# Patient Record
Sex: FEMALE | Race: WHITE | NOT HISPANIC OR LATINO | Employment: FULL TIME | URBAN - METROPOLITAN AREA
[De-identification: names, ages, dates, MRNs, and addresses within clinical notes are randomized per-mention and may not be internally consistent; named-entity substitution may affect disease eponyms.]

---

## 2018-04-10 ENCOUNTER — HISTORICAL (OUTPATIENT)
Dept: ADMINISTRATIVE | Facility: HOSPITAL | Age: 53
End: 2018-04-10

## 2018-10-12 ENCOUNTER — HISTORICAL (OUTPATIENT)
Dept: ADMINISTRATIVE | Facility: HOSPITAL | Age: 53
End: 2018-10-12

## 2018-10-12 LAB
ABS NEUT (OLG): 3.4
ALBUMIN SERPL-MCNC: 5.3 GM/DL (ref 3.4–5)
ALBUMIN/GLOB SERPL: 2.04 {RATIO} (ref 1.5–2.5)
ALP SERPL-CCNC: 75 UNIT/L (ref 38–126)
ALT SERPL-CCNC: 13 UNIT/L (ref 7–52)
AST SERPL-CCNC: 15 UNIT/L (ref 15–37)
BILIRUB SERPL-MCNC: 0.7 MG/DL (ref 0.2–1)
BILIRUBIN DIRECT+TOT PNL SERPL-MCNC: 0.2 MG/DL (ref 0–0.5)
BILIRUBIN DIRECT+TOT PNL SERPL-MCNC: 0.5 MG/DL
BUN SERPL-MCNC: 34 MG/DL (ref 7–18)
CALCIUM SERPL-MCNC: 10.5 MG/DL (ref 8.5–10)
CHLORIDE SERPL-SCNC: 100 MMOL/L (ref 98–107)
CHOLEST SERPL-MCNC: 282 MG/DL (ref 0–200)
CHOLEST/HDLC SERPL: 6.3 {RATIO}
CO2 SERPL-SCNC: 28 MMOL/L (ref 21–32)
CREAT SERPL-MCNC: 1.07 MG/DL (ref 0.6–1.3)
ERYTHROCYTE [DISTWIDTH] IN BLOOD BY AUTOMATED COUNT: 12.5 % (ref 11.5–17)
GLOBULIN SER-MCNC: 2.6 GM/DL (ref 1.2–3)
GLUCOSE SERPL-MCNC: 91 MG/DL (ref 74–106)
HCT VFR BLD AUTO: 42.8 % (ref 37–47)
HDLC SERPL-MCNC: 45 MG/DL (ref 35–60)
HGB BLD-MCNC: 14.2 GM/DL (ref 12–16)
LDLC SERPL CALC-MCNC: 220 MG/DL (ref 0–129)
LYMPHOCYTES # BLD AUTO: 1.4 X10(3)/MCL (ref 0.6–3.4)
LYMPHOCYTES NFR BLD AUTO: 26.7 % (ref 13–40)
MCH RBC QN AUTO: 28.5 PG (ref 27–31.2)
MCHC RBC AUTO-ENTMCNC: 33 GM/DL (ref 32–36)
MCV RBC AUTO: 86 FL (ref 80–94)
MONOCYTES # BLD AUTO: 0.5 X10(3)/MCL (ref 0–1.8)
MONOCYTES NFR BLD AUTO: 9.3 % (ref 0.1–24)
NEUTROPHILS NFR BLD AUTO: 64 % (ref 47–80)
PLATELET # BLD AUTO: 147 X10(3)/MCL (ref 130–400)
PMV BLD AUTO: 9.2 FL
POTASSIUM SERPL-SCNC: 3.5 MMOL/L (ref 3.5–5.1)
PROT SERPL-MCNC: 7.9 GM/DL (ref 6.4–8.2)
RBC # BLD AUTO: 4.98 X10(6)/MCL (ref 4.2–5.4)
SODIUM SERPL-SCNC: 139 MMOL/L (ref 136–145)
TRIGL SERPL-MCNC: 77 MG/DL (ref 30–150)
TSH SERPL-ACNC: 1.77 MIU/ML (ref 0.35–4.94)
VLDLC SERPL CALC-MCNC: 15.4 MG/DL
WBC # SPEC AUTO: 5.3 X10(3)/MCL (ref 4.5–11.5)

## 2018-11-14 ENCOUNTER — HISTORICAL (OUTPATIENT)
Dept: ADMINISTRATIVE | Facility: HOSPITAL | Age: 53
End: 2018-11-14

## 2018-11-14 LAB — CALCIUM SERPL-MCNC: 10.2 MG/DL (ref 8.5–10)

## 2018-11-15 ENCOUNTER — HISTORICAL (OUTPATIENT)
Dept: ADMINISTRATIVE | Facility: HOSPITAL | Age: 53
End: 2018-11-15

## 2018-11-15 ENCOUNTER — HISTORICAL (OUTPATIENT)
Dept: LAB | Facility: HOSPITAL | Age: 53
End: 2018-11-15

## 2018-11-15 LAB
H PYLORI AB SER IA-ACNC: NEGATIVE
PTH-INTACT SERPL-MCNC: 61.8 PG/ML (ref 18.4–80.1)

## 2019-03-12 ENCOUNTER — HISTORICAL (OUTPATIENT)
Dept: ADMINISTRATIVE | Facility: HOSPITAL | Age: 54
End: 2019-03-12

## 2019-03-14 ENCOUNTER — HISTORICAL (OUTPATIENT)
Dept: ADMINISTRATIVE | Facility: HOSPITAL | Age: 54
End: 2019-03-14

## 2019-03-14 LAB
ABS NEUT (OLG): 4.9 X10(3)/MCL (ref 2.1–9.2)
ALBUMIN SERPL-MCNC: 4.8 GM/DL (ref 3.4–5)
ALBUMIN/GLOB SERPL: 1.85 {RATIO} (ref 1.5–2.5)
ALP SERPL-CCNC: 62 UNIT/L (ref 38–126)
ALT SERPL-CCNC: 10 UNIT/L (ref 7–52)
AST SERPL-CCNC: 11 UNIT/L (ref 15–37)
BILIRUB SERPL-MCNC: 0.5 MG/DL (ref 0.2–1)
BILIRUBIN DIRECT+TOT PNL SERPL-MCNC: 0.1 MG/DL (ref 0–0.5)
BILIRUBIN DIRECT+TOT PNL SERPL-MCNC: 0.4 MG/DL
BUN SERPL-MCNC: 24 MG/DL (ref 7–18)
CALCIUM SERPL-MCNC: 10 MG/DL (ref 8.5–10)
CHLORIDE SERPL-SCNC: 103 MMOL/L (ref 98–107)
CO2 SERPL-SCNC: 29 MMOL/L (ref 21–32)
CREAT SERPL-MCNC: 0.94 MG/DL (ref 0.6–1.3)
ERYTHROCYTE [DISTWIDTH] IN BLOOD BY AUTOMATED COUNT: 12.7 % (ref 11.5–17)
GLOBULIN SER-MCNC: 2.6 GM/DL (ref 1.2–3)
GLUCOSE SERPL-MCNC: 93 MG/DL (ref 74–106)
HCT VFR BLD AUTO: 38.4 % (ref 37–47)
HGB BLD-MCNC: 13.3 GM/DL (ref 12–16)
LYMPHOCYTES # BLD AUTO: 1.5 X10(3)/MCL (ref 0.6–3.4)
LYMPHOCYTES NFR BLD AUTO: 22.1 % (ref 13–40)
MCH RBC QN AUTO: 28.9 PG (ref 27–31.2)
MCHC RBC AUTO-ENTMCNC: 35 GM/DL (ref 32–36)
MCV RBC AUTO: 84 FL (ref 80–94)
MONOCYTES # BLD AUTO: 0.5 X10(3)/MCL (ref 0.1–1.3)
MONOCYTES NFR BLD AUTO: 7.8 % (ref 0.1–24)
NEUTROPHILS NFR BLD AUTO: 70.1 % (ref 47–80)
PLATELET # BLD AUTO: 141 X10(3)/MCL (ref 130–400)
PMV BLD AUTO: 9 FL (ref 9.4–12.4)
POTASSIUM SERPL-SCNC: 4.5 MMOL/L (ref 3.5–5.1)
PROT SERPL-MCNC: 7.4 GM/DL (ref 6.4–8.2)
RBC # BLD AUTO: 4.6 X10(6)/MCL (ref 4.2–5.4)
SODIUM SERPL-SCNC: 138 MMOL/L (ref 136–145)
TSH SERPL-ACNC: 1.82 MIU/ML (ref 0.35–4.94)
WBC # SPEC AUTO: 6.9 X10(3)/MCL (ref 4.5–11.5)

## 2021-10-12 ENCOUNTER — HISTORICAL (OUTPATIENT)
Dept: ADMINISTRATIVE | Facility: HOSPITAL | Age: 56
End: 2021-10-12

## 2021-10-12 LAB
ABS NEUT (OLG): 2.4 X10(3)/MCL (ref 2.1–9.2)
ALBUMIN SERPL-MCNC: 4.6 GM/DL (ref 3.4–5)
ALBUMIN/GLOB SERPL: 2.42 {RATIO} (ref 1.5–2.5)
ALP SERPL-CCNC: 71 UNIT/L (ref 38–126)
ALT SERPL-CCNC: 10 UNIT/L (ref 7–52)
AST SERPL-CCNC: 12 UNIT/L (ref 15–37)
BILIRUB SERPL-MCNC: 0.5 MG/DL (ref 0.2–1)
BILIRUBIN DIRECT+TOT PNL SERPL-MCNC: 0.1 MG/DL (ref 0–0.5)
BILIRUBIN DIRECT+TOT PNL SERPL-MCNC: 0.4 MG/DL
BUN SERPL-MCNC: 16 MG/DL (ref 7–18)
CALCIUM SERPL-MCNC: 10.5 MG/DL (ref 8.5–10)
CHLORIDE SERPL-SCNC: 106 MMOL/L (ref 98–107)
CHOLEST SERPL-MCNC: 217 MG/DL (ref 0–200)
CHOLEST/HDLC SERPL: 5.7 {RATIO}
CO2 SERPL-SCNC: 28 MMOL/L (ref 21–32)
CREAT SERPL-MCNC: 0.97 MG/DL (ref 0.6–1.3)
ERYTHROCYTE [DISTWIDTH] IN BLOOD BY AUTOMATED COUNT: 12.3 % (ref 11.5–17)
GLOBULIN SER-MCNC: 1.9 GM/DL (ref 1.2–3)
GLUCOSE SERPL-MCNC: 97 MG/DL (ref 74–106)
HCT VFR BLD AUTO: 36.3 % (ref 37–47)
HDLC SERPL-MCNC: 38 MG/DL (ref 35–60)
HGB BLD-MCNC: 12.1 GM/DL (ref 12–16)
LDLC SERPL CALC-MCNC: 126 MG/DL (ref 0–129)
LYMPHOCYTES # BLD AUTO: 1.1 X10(3)/MCL (ref 0.6–3.4)
LYMPHOCYTES NFR BLD AUTO: 27.1 % (ref 13–40)
MCH RBC QN AUTO: 27.6 PG (ref 27–31.2)
MCHC RBC AUTO-ENTMCNC: 33 GM/DL (ref 32–36)
MCV RBC AUTO: 83 FL (ref 80–94)
MONOCYTES # BLD AUTO: 0.5 X10(3)/MCL (ref 0.1–1.3)
MONOCYTES NFR BLD AUTO: 12.1 % (ref 0.1–24)
NEUTROPHILS NFR BLD AUTO: 60.8 % (ref 47–80)
PLATELET # BLD AUTO: 111 X10(3)/MCL (ref 130–400)
PMV BLD AUTO: 9.3 FL (ref 9.4–12.4)
POTASSIUM SERPL-SCNC: 4.7 MMOL/L (ref 3.5–5.1)
PROT SERPL-MCNC: 6.5 GM/DL (ref 6.4–8.2)
RBC # BLD AUTO: 4.38 X10(6)/MCL (ref 4.2–5.4)
SODIUM SERPL-SCNC: 140 MMOL/L (ref 136–145)
TRIGL SERPL-MCNC: 179 MG/DL (ref 30–150)
TSH SERPL-ACNC: 1.39 MIU/ML (ref 0.35–4.94)
VLDLC SERPL CALC-MCNC: 35.8 MG/DL
WBC # SPEC AUTO: 4 X10(3)/MCL (ref 4.5–11.5)

## 2022-04-11 ENCOUNTER — HISTORICAL (OUTPATIENT)
Dept: ADMINISTRATIVE | Facility: HOSPITAL | Age: 57
End: 2022-04-11

## 2022-04-25 VITALS
SYSTOLIC BLOOD PRESSURE: 114 MMHG | HEIGHT: 68 IN | OXYGEN SATURATION: 96 % | BODY MASS INDEX: 29.94 KG/M2 | DIASTOLIC BLOOD PRESSURE: 71 MMHG | WEIGHT: 197.56 LBS

## 2022-07-14 PROBLEM — R53.83 FATIGUE: Status: ACTIVE | Noted: 2022-07-14

## 2022-07-14 PROBLEM — R68.89 FORGETFULNESS: Status: ACTIVE | Noted: 2022-07-14

## 2022-07-14 PROBLEM — E66.9 OBESITY: Status: ACTIVE | Noted: 2022-07-14

## 2022-07-14 PROBLEM — E78.5 HYPERLIPIDEMIA: Status: ACTIVE | Noted: 2022-07-14

## 2022-07-14 PROBLEM — G25.0 ESSENTIAL TREMOR: Status: ACTIVE | Noted: 2022-07-14

## 2022-07-14 PROBLEM — F32.A DEPRESSIVE DISORDER: Status: ACTIVE | Noted: 2022-07-14

## 2023-01-17 PROBLEM — Z28.21 INFLUENZA VACCINE REFUSED: Status: ACTIVE | Noted: 2023-01-17

## 2023-01-17 PROBLEM — F41.9 ANXIETY: Status: ACTIVE | Noted: 2023-01-17

## 2023-07-17 PROBLEM — M85.80 OSTEOPENIA: Status: ACTIVE | Noted: 2023-07-17

## 2023-07-17 PROBLEM — K21.9 GASTROESOPHAGEAL REFLUX DISEASE WITHOUT ESOPHAGITIS: Status: ACTIVE | Noted: 2023-07-17

## 2024-01-08 PROBLEM — D69.6 THROMBOCYTOPENIA: Status: ACTIVE | Noted: 2024-01-08

## 2024-07-16 ENCOUNTER — OFFICE VISIT (OUTPATIENT)
Dept: URGENT CARE | Facility: CLINIC | Age: 59
End: 2024-07-16
Payer: COMMERCIAL

## 2024-07-16 VITALS
OXYGEN SATURATION: 97 % | SYSTOLIC BLOOD PRESSURE: 128 MMHG | BODY MASS INDEX: 29.76 KG/M2 | DIASTOLIC BLOOD PRESSURE: 73 MMHG | TEMPERATURE: 98 F | HEART RATE: 78 BPM | WEIGHT: 190 LBS | RESPIRATION RATE: 18 BRPM

## 2024-07-16 DIAGNOSIS — M54.50 MIDLINE LOW BACK PAIN WITHOUT SCIATICA, UNSPECIFIED CHRONICITY: Primary | ICD-10-CM

## 2024-07-16 PROCEDURE — 96372 THER/PROPH/DIAG INJ SC/IM: CPT | Mod: ,,, | Performed by: FAMILY MEDICINE

## 2024-07-16 PROCEDURE — 99213 OFFICE O/P EST LOW 20 MIN: CPT | Mod: 25,,, | Performed by: FAMILY MEDICINE

## 2024-07-16 RX ORDER — DEXAMETHASONE SODIUM PHOSPHATE 100 MG/10ML
10 INJECTION INTRAMUSCULAR; INTRAVENOUS
Status: COMPLETED | OUTPATIENT
Start: 2024-07-16 | End: 2024-07-16

## 2024-07-16 RX ORDER — HYDROCODONE BITARTRATE AND ACETAMINOPHEN 5; 325 MG/1; MG/1
1 TABLET ORAL EVERY 6 HOURS PRN
Qty: 12 TABLET | Refills: 0 | Status: SHIPPED | OUTPATIENT
Start: 2024-07-16 | End: 2024-07-19

## 2024-07-16 RX ORDER — NABUMETONE 500 MG/1
500 TABLET, FILM COATED ORAL 2 TIMES DAILY PRN
Qty: 20 TABLET | Refills: 0 | Status: SHIPPED | OUTPATIENT
Start: 2024-07-16 | End: 2024-07-26

## 2024-07-16 RX ORDER — KETOROLAC TROMETHAMINE 30 MG/ML
30 INJECTION, SOLUTION INTRAMUSCULAR; INTRAVENOUS
Status: COMPLETED | OUTPATIENT
Start: 2024-07-16 | End: 2024-07-16

## 2024-07-16 RX ORDER — METHOCARBAMOL 500 MG/1
500 TABLET, FILM COATED ORAL 4 TIMES DAILY
Qty: 28 TABLET | Refills: 0 | Status: SHIPPED | OUTPATIENT
Start: 2024-07-16 | End: 2024-07-23

## 2024-07-16 RX ADMIN — KETOROLAC TROMETHAMINE 30 MG: 30 INJECTION, SOLUTION INTRAMUSCULAR; INTRAVENOUS at 04:07

## 2024-07-16 RX ADMIN — DEXAMETHASONE SODIUM PHOSPHATE 10 MG: 100 INJECTION INTRAMUSCULAR; INTRAVENOUS at 04:07

## 2024-07-16 NOTE — PROGRESS NOTES
Subjective:      Patient ID: Belinda Garcia is a 59 y.o. female.    Vitals:  weight is 86.2 kg (190 lb). Her oral temperature is 98.1 °F (36.7 °C). Her blood pressure is 128/73 and her pulse is 78. Her respiration is 18 and oxygen saturation is 97%.     Chief Complaint: Back Pain     Patient is a 59 y.o. female who presents to urgent care with complaints of lower back pain x today. Alleviating factors include flexeril with no relief. Patient denies injury to back and dysuria.  Patient states she started Pilates yesterday.  This morning when she got up and bent down to  some dog food she felt sudden pain in the lower midline back.  Denies any radiation of the pain.  Denies any weakness or numbness in the legs.  Denies any bladder or bowel dysfunction.  Took Flexeril today but it did not help.    Back Pain      Constitution: Negative.   HENT: Negative.     Cardiovascular: Negative.    Eyes: Negative.    Respiratory: Negative.     Gastrointestinal: Negative.    Genitourinary: Negative.    Musculoskeletal:  Positive for back pain.   Skin: Negative.    Allergic/Immunologic: Negative.    Neurological: Negative.    Hematologic/Lymphatic: Negative.       Objective:     Physical Exam   Constitutional: She is oriented to person, place, and time.  Non-toxic appearance. She does not appear ill. She appears distressed (Mild distress secondary to back pain).   HENT:   Head: Normocephalic and atraumatic.   Eyes: Conjunctivae are normal.   Pulmonary/Chest: Effort normal.   Abdominal: Normal appearance.   Musculoskeletal:         General: Tenderness (To palpation over the lumbar spine.  Hypertonicity of the paravertebral musculature at the lumbar level.  5/5 strength in the bilateral lower extremities) present.   Neurological: She is alert and oriented to person, place, and time.   Psychiatric: Her behavior is normal. Mood, judgment and thought content normal.   Vitals reviewed.         Previous History      Review of  patient's allergies indicates:  No Known Allergies    Past Medical History:   Diagnosis Date    Benign essential tremor     Depression     Fatigue     Forgetfulness     History of viral meningitis     HLD (hyperlipidemia)      Current Outpatient Medications   Medication Instructions    alendronate (FOSAMAX) 70 mg, Every 7 days    ALPRAZolam (XANAX) 0.5 mg, Oral, 2 times daily PRN    buPROPion (WELLBUTRIN XL) 300 MG 24 hr tablet TAKE 1 TABLET BY MOUTH DAILY    cyclobenzaprine (FLEXERIL) 10 MG tablet TAKE 1 TABLET BY MOUTH 3 TIMES A DAY AS NEEDED FOR SPASM    fluticasone propionate (FLONASE) 50 mcg/actuation nasal spray by Nasal route.    gabapentin (NEURONTIN) 100 MG capsule 2 caps q am and  4 caps in the evening    HYDROcodone-acetaminophen (NORCO) 5-325 mg per tablet 1 tablet, Oral, Every 6 hours PRN    L-METHYLFOLATE 15 mg, Oral, Daily    methocarbamoL (ROBAXIN) 500 mg, Oral, 4 times daily    nabumetone (RELAFEN) 500 mg, Oral, 2 times daily PRN    pantoprazole (PROTONIX) 40 mg, Oral    propranoloL (INDERAL) 10 mg, Oral, 3 times daily PRN    sertraline (ZOLOFT) 200 mg, Oral    tiZANidine (ZANAFLEX) 4 mg, Oral, 2 times daily PRN     Past Surgical History:   Procedure Laterality Date    CHOLECYSTECTOMY       Family History   Problem Relation Name Age of Onset    Hypertension Mother      Aneurysm Father      Anxiety disorder Brother      Depression Brother         Social History     Tobacco Use    Smoking status: Never    Smokeless tobacco: Never   Substance Use Topics    Alcohol use: Never    Drug use: Never        Physical Exam      Vital Signs Reviewed   /73   Pulse 78   Temp 98.1 °F (36.7 °C) (Oral)   Resp 18   Wt 86.2 kg (190 lb)   SpO2 97%   BMI 29.76 kg/m²        Procedures    Procedures     Labs     Results for orders placed or performed in visit on 03/13/24   Comprehensive Metabolic Panel   Result Value Ref Range    Sodium 141 136 - 145 mmol/L    Potassium 4.5 3.5 - 5.1 mmol/L    Chloride 105 98 -  107 mmol/L    CO2 28 21 - 32 mmol/L    Glucose 84 74 - 106 mg/dL    Blood Urea Nitrogen 22.0 (H) 7.0 - 18.0 mg/dL    Creatinine 1.07 0.60 - 1.30 mg/dL    Calcium 10.1 8.5 - 10.1 mg/dL    Protein Total 6.6 6.4 - 8.2 gm/dL    Albumin 5.0 3.4 - 5.0 g/dL    Globulin 1.6 1.2 - 3.0 gm/dL    Albumin/Globulin Ratio 3.1 (H) 1.5 - 2.0 ratio    Bilirubin Total 0.4 0.2 - 1.0 mg/dL    ALP 57 38 - 126 unit/L    ALT 9 7 - 52 unit/L    AST 11 (L) 15 - 37 unit/L    eGFR 60 mls/min/1.73/m2   Bilirubin, Direct   Result Value Ref Range    Bilirubin Direct 0.1 0.0 - 0.5 mg/dL   Lipid Panel   Result Value Ref Range    Cholesterol Total 210 (H) 0 - 200 mg/dL    HDL Cholesterol 43 35 - 60 mg/dL    Triglyceride 165 (H) 30 - 150 mg/dL    Cholesterol/HDL Ratio 5     Very Low Density Lipoprotein 33     LDL Cholesterol 134.00 (H) 0.00 - 129.00 mg/dL   TSH   Result Value Ref Range    TSH 1.930 0.350 - 4.940 uIU/mL   CBC with Differential   Result Value Ref Range    WBC 3.80 (L) 4.50 - 11.50 x10(3)/mcL    RBC 4.74 4.20 - 5.40 x10(6)/mcL    Hgb 12.8 12.0 - 16.0 g/dL    Hct 38.1 37.0 - 47.0 %    MCV 80.4 80.0 - 94.0 fL    MCH 27.0 27.0 - 31.0 pg    MCHC 33.6 33.0 - 36.0 g/dL    RDW 11.8 11.5 - 17.0 %    Platelet 126 (L) 130 - 400 x10(3)/mcL    MPV 10.3 7.4 - 10.4 fL    Neut % 65.6 %    Lymph % 25.1 %    Mono % 9.3 %    Lymph # 1.0 0.6 - 4.6 x10(3)/mcL    Neut # 2.4 2.1 - 9.2 x10(3)/mcL    Mono # 0.4 0.1 - 1.3 x10(3)/mcL       Assessment:     1. Midline low back pain without sciatica, unspecified chronicity        Plan:   Medications sent to pharmacy  Start the oral anti-inflammatory nabumetone tomorrow morning.  Take with food.  Do not take any Advil Alleve Motrin ibuprofen naproxen with it  You can start the muscle relaxer and pain medication this evening.  Do not take them at the same time.  Stagger them by at least 3-4 hours.  May cause drowsiness.  Do not drink alcohol or drive when taking them.  Stop taking Flexeril.  For the 1st 72 hours  apply cold compress to the affected area through 4 times a day.  After 3 days you may apply cold compress or heat whichever feels better.  Avoid heavy lifting or strenuous activities for the next several days.  As discussed if your symptoms are not improving or they are worsening return to clinic and we will perform an x-ray and discuss referral to a specialist.    Midline low back pain without sciatica, unspecified chronicity    Other orders  -     ketorolac injection 30 mg  -     dexAMETHasone injection 10 mg  -     nabumetone (RELAFEN) 500 MG tablet; Take 1 tablet (500 mg total) by mouth 2 (two) times daily as needed for Pain.  Dispense: 20 tablet; Refill: 0  -     methocarbamoL (ROBAXIN) 500 MG Tab; Take 1 tablet (500 mg total) by mouth 4 (four) times daily. for 7 days  Dispense: 28 tablet; Refill: 0  -     HYDROcodone-acetaminophen (NORCO) 5-325 mg per tablet; Take 1 tablet by mouth every 6 (six) hours as needed for Pain.  Dispense: 12 tablet; Refill: 0

## 2024-07-16 NOTE — PATIENT INSTRUCTIONS
Plan:   Medications sent to pharmacy  Start the oral anti-inflammatory nabumetone tomorrow morning.  Take with food.  Do not take any Advil Alleve Motrin ibuprofen naproxen with it  You can start the muscle relaxer and pain medication this evening.  Do not take them at the same time.  Stagger them by at least 3-4 hours.  May cause drowsiness.  Do not drink alcohol or drive when taking them.  Stop taking Flexeril.  For the 1st 72 hours apply cold compress to the affected area through 4 times a day.  After 3 days you may apply cold compress or heat whichever feels better.  Avoid heavy lifting or strenuous activities for the next several days.  As discussed if your symptoms are not improving or they are worsening return to clinic and we will perform an x-ray and discuss referral to a specialist.

## 2025-01-24 PROCEDURE — 85652 RBC SED RATE AUTOMATED: CPT | Performed by: FAMILY MEDICINE
